# Patient Record
Sex: MALE | Race: OTHER
[De-identification: names, ages, dates, MRNs, and addresses within clinical notes are randomized per-mention and may not be internally consistent; named-entity substitution may affect disease eponyms.]

---

## 2021-03-03 ENCOUNTER — HOSPITAL ENCOUNTER (EMERGENCY)
Dept: HOSPITAL 15 - ER | Age: 61
Discharge: HOME | End: 2021-03-03
Payer: MEDICARE

## 2021-03-03 VITALS — SYSTOLIC BLOOD PRESSURE: 118 MMHG | DIASTOLIC BLOOD PRESSURE: 66 MMHG

## 2021-03-03 VITALS — WEIGHT: 147 LBS | BODY MASS INDEX: 22.28 KG/M2 | HEIGHT: 68 IN
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
## 2021-03-03 DIAGNOSIS — G20: ICD-10-CM

## 2021-03-03 DIAGNOSIS — G89.29: ICD-10-CM

## 2021-03-03 DIAGNOSIS — M25.551: ICD-10-CM

## 2021-03-03 DIAGNOSIS — M54.5: ICD-10-CM

## 2021-03-03 DIAGNOSIS — S01.311A: Primary | ICD-10-CM

## 2021-03-03 DIAGNOSIS — M47.22: ICD-10-CM
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
## 2021-03-03 LAB
ALBUMIN SERPL-MCNC: 4.2 G/DL (ref 3.4–5)
ALP SERPL-CCNC: 62 U/L (ref 45–117)
ALT SERPL-CCNC: 17 U/L (ref 16–61)
ANION GAP SERPL CALCULATED.3IONS-SCNC: 6 MMOL/L (ref 5–15)
APTT PPP: 25.3 SEC (ref 23–31.2)
BILIRUB SERPL-MCNC: 1.9 MG/DL (ref 0.2–1)
BUN SERPL-MCNC: 17 MG/DL (ref 7–18)
BUN/CREAT SERPL: 19.5
CALCIUM SERPL-MCNC: 9.1 MG/DL (ref 8.5–10.1)
CHLORIDE SERPL-SCNC: 103 MMOL/L (ref 98–107)
CO2 SERPL-SCNC: 30 MMOL/L (ref 21–32)
GLUCOSE SERPL-MCNC: 84 MG/DL (ref 74–106)
HCT VFR BLD AUTO: 41.5 % (ref 41–53)
HGB BLD-MCNC: 14.5 G/DL (ref 13.5–17.5)
INR PPP: 1.02 (ref 0.9–1.15)
MCH RBC QN AUTO: 33.5 PG (ref 28–32)
MCV RBC AUTO: 96 FL (ref 80–100)
NRBC BLD QL AUTO: 0.1 %
POTASSIUM SERPL-SCNC: 4.2 MMOL/L (ref 3.5–5.1)
PROT SERPL-MCNC: 7.2 G/DL (ref 6.4–8.2)
SODIUM SERPL-SCNC: 139 MMOL/L (ref 136–145)

## 2021-03-03 PROCEDURE — 73502 X-RAY EXAM HIP UNI 2-3 VIEWS: CPT

## 2021-03-03 PROCEDURE — 85730 THROMBOPLASTIN TIME PARTIAL: CPT

## 2021-03-03 PROCEDURE — 81002 URINALYSIS NONAUTO W/O SCOPE: CPT

## 2021-03-03 PROCEDURE — 85025 COMPLETE CBC W/AUTO DIFF WBC: CPT

## 2021-03-03 PROCEDURE — 84484 ASSAY OF TROPONIN QUANT: CPT

## 2021-03-03 PROCEDURE — 85610 PROTHROMBIN TIME: CPT

## 2021-03-03 PROCEDURE — 36415 COLL VENOUS BLD VENIPUNCTURE: CPT

## 2021-03-03 PROCEDURE — 12013 RPR F/E/E/N/L/M 2.6-5.0 CM: CPT

## 2021-03-03 PROCEDURE — 99285 EMERGENCY DEPT VISIT HI MDM: CPT

## 2021-03-03 PROCEDURE — 96374 THER/PROPH/DIAG INJ IV PUSH: CPT

## 2021-03-03 PROCEDURE — 70450 CT HEAD/BRAIN W/O DYE: CPT

## 2021-03-03 PROCEDURE — 72125 CT NECK SPINE W/O DYE: CPT

## 2021-03-03 PROCEDURE — 93005 ELECTROCARDIOGRAM TRACING: CPT

## 2021-03-03 PROCEDURE — 80053 COMPREHEN METABOLIC PANEL: CPT

## 2024-11-29 ENCOUNTER — HOSPITAL ENCOUNTER (INPATIENT)
Dept: HOSPITAL 15 - ER | Age: 64
LOS: 2 days | Discharge: HOSPICE | DRG: 56 | End: 2024-12-01
Attending: PHYSICIAN ASSISTANT
Payer: MEDICARE

## 2024-11-29 VITALS — BODY MASS INDEX: 34.75 KG/M2 | HEIGHT: 68 IN | WEIGHT: 229.28 LBS

## 2024-11-29 VITALS — OXYGEN SATURATION: 97 % | HEART RATE: 70 BPM | RESPIRATION RATE: 18 BRPM

## 2024-11-29 DIAGNOSIS — Z79.899: ICD-10-CM

## 2024-11-29 DIAGNOSIS — K14.0: ICD-10-CM

## 2024-11-29 DIAGNOSIS — G92.8: ICD-10-CM

## 2024-11-29 DIAGNOSIS — G20.A1: Primary | ICD-10-CM

## 2024-11-29 DIAGNOSIS — Z51.5: ICD-10-CM

## 2024-11-29 DIAGNOSIS — R62.7: ICD-10-CM

## 2024-11-29 DIAGNOSIS — F02.80: ICD-10-CM

## 2024-11-29 DIAGNOSIS — N40.0: ICD-10-CM

## 2024-11-29 LAB
ALBUMIN SERPL-MCNC: 4.2 G/DL (ref 3.2–4.8)
ALP SERPL-CCNC: 60 U/L (ref 46–116)
ALT SERPL-CCNC: < 9 U/L (ref 7–40)
ANION GAP SERPL CALCULATED.3IONS-SCNC: 9 MMOL/L (ref 5–15)
BILIRUB SERPL-MCNC: 2.3 MG/DL (ref 0.2–1)
BUN SERPL-MCNC: 17 MG/DL (ref 9–23)
BUN/CREAT SERPL: 29.3 (ref 10–20)
CALCIUM SERPL-MCNC: 9.7 MG/DL (ref 8.7–10.4)
CHLORIDE SERPL-SCNC: 101 MMOL/L (ref 98–107)
CO2 SERPL-SCNC: 29 MMOL/L (ref 20–31)
GLUCOSE SERPL-MCNC: 83 MG/DL (ref 74–106)
HCT VFR BLD AUTO: 39.1 % (ref 41–53)
HGB BLD-MCNC: 13.4 G/DL (ref 13.5–17.5)
MCH RBC QN AUTO: 31.7 PG (ref 28–32)
MCV RBC AUTO: 92.4 FL (ref 80–100)
NRBC BLD QL AUTO: 0.1 %
POTASSIUM SERPL-SCNC: 3.7 MMOL/L (ref 3.5–5.1)
PROT SERPL-MCNC: 6.2 G/DL (ref 5.7–8.2)
PROT UR STRIP.AUTO-MCNC: (no result) MG/DL
SODIUM SERPL-SCNC: 139 MMOL/L (ref 136–145)

## 2024-11-29 PROCEDURE — 93005 ELECTROCARDIOGRAM TRACING: CPT

## 2024-11-29 PROCEDURE — 83036 HEMOGLOBIN GLYCOSYLATED A1C: CPT

## 2024-11-29 PROCEDURE — 82607 VITAMIN B-12: CPT

## 2024-11-29 PROCEDURE — 97162 PT EVAL MOD COMPLEX 30 MIN: CPT

## 2024-11-29 PROCEDURE — 80320 DRUG SCREEN QUANTALCOHOLS: CPT

## 2024-11-29 PROCEDURE — 85379 FIBRIN DEGRADATION QUANT: CPT

## 2024-11-29 PROCEDURE — 84443 ASSAY THYROID STIM HORMONE: CPT

## 2024-11-29 PROCEDURE — 80053 COMPREHEN METABOLIC PANEL: CPT

## 2024-11-29 PROCEDURE — 82140 ASSAY OF AMMONIA: CPT

## 2024-11-29 PROCEDURE — 82746 ASSAY OF FOLIC ACID SERUM: CPT

## 2024-11-29 PROCEDURE — 80307 DRUG TEST PRSMV CHEM ANLYZR: CPT

## 2024-11-29 PROCEDURE — 85025 COMPLETE CBC W/AUTO DIFF WBC: CPT

## 2024-11-29 PROCEDURE — 84484 ASSAY OF TROPONIN QUANT: CPT

## 2024-11-29 PROCEDURE — 83735 ASSAY OF MAGNESIUM: CPT

## 2024-11-29 PROCEDURE — 81001 URINALYSIS AUTO W/SCOPE: CPT

## 2024-11-29 PROCEDURE — 36415 COLL VENOUS BLD VENIPUNCTURE: CPT

## 2024-11-29 PROCEDURE — 70450 CT HEAD/BRAIN W/O DYE: CPT

## 2024-11-29 PROCEDURE — 71045 X-RAY EXAM CHEST 1 VIEW: CPT

## 2024-11-29 RX ADMIN — MORPHINE SULFATE ONE MG: 4 INJECTION, SOLUTION INTRAMUSCULAR; INTRAVENOUS at 20:49

## 2024-11-29 RX ADMIN — MAGNESIUM SULFATE IN DEXTROSE ONE MLS/HR: 10 INJECTION, SOLUTION INTRAVENOUS at 23:11

## 2024-11-29 RX ADMIN — MORPHINE SULFATE ONE MG: 4 INJECTION, SOLUTION INTRAMUSCULAR; INTRAVENOUS at 17:38

## 2024-11-29 NOTE — ED.PDOC
HPI (NEURO)


HPI Comments


This is a 64-year-old male who arrives via EMS from home due to complaints of 

altered level of consciousness earlier today noted by wife.  Wife states that 

the patient seemed to sterile off into space and was unresponsive to verbal 

stimuli.  While the patient displays advanced Parkinson's symptoms, wife states 

that this was a new symptom that she had not experienced before.  Additionally, 

wife states that the patient may have some tongue swelling and throat swelling 

as he indicated that that might be occurring.  Patient did not display any signs

of anaphylaxis at arrival.  Wife denied any fever nausea or vomiting.  Vital 

signs were stable on arrival.  EMS states that the patient did display some mild

agonal breathing at time of arrival.  Patient was satting at 97% on room air at 

arrival.


Chief Complaint:  ALOC


Time Seen by MD:  17:00


Information Source:  Patient, Spouse


Mode of Arrival:  EMS


Severity:  Mild


Dizziness/Weakness Severity:  Unable to do activities


Headache Severity:  None


Timing:  Hours


Duration:  Minutes


Prehospital treatment:  12 Lead EKG


Onset:  At rest


Circumstances:  Spontaneous


Symptoms:  Difficulty talking


Associated Signs and Symptoms:  Altered Mental Status





Past Medical History


PAST MEDICAL HISTORY:  Denies


Past Medical History (Other):  


Advanced Parkinson's


Surgical History:  Denies all surgeries





Family History


Family History:  Reviewed,noncontributory to illness





Social History


Smoker:  Non-Smoker


Alcohol:  Denies ETOH Use


Drugs:  Denies Drug Use


Lives In:  Home





Constitutional:  denies: chills, diaphoresis, fatigue, fever, malaise, sweats, 

weakness, others


EENTM:  reports: others (Possible tongue swelling); denies: blurred vision, d

ouble vision, ear bleeding, ear discharge, ear drainage, ear pain, ear ringing, 

eye pain, eye redness, hearing loss, mouth pain, mouth swelling, nasal 

discharge, nose bleeding, nose congestion, nose pain, photophobia, tearing, 

throat pain, throat swelling, voice changes


Respiratory:  denies: cough, hemoptysis, orthopnea, SOB at rest, shortness of 

breath, SOB with excertion, stridor, wheezing, others


Cardiovascular:  denies: chest pain, dizzy spells, diaphoresis, Dyspnea on 

exertion, edema, irregular heart beat, left arm pain, lightheadedness, 

palpitations, PND, syncope, others


Gastrointestinal:  denies: abdomen distended, abdominal pain, blood streaked 

bowels, constipated, diarrhea, dysphagia, difficulty swallowing, hematemesis, 

melena, nausea, poor appetite, poor fluid intake, rectal bleeding, rectal pain, 

vomiting, others


Genitourinary:  denies: burning, dysuria, flank pain, frequency, hematuria, 

incontinence, penile discharge, penile sore, pain, testicle pain, testicle 

swelling, urgency, others


Neurological:  denies: dizziness, fainting, headache, left sided numbness, left 

sided weakness, numbness, paresthesia, pre-existing deficit, right sided 

numbness, right sided weakness, seizure, speech problems, tingling, tremors, 

weakness, others


Musculoskeletal:  denies: back pain, gout, joint pain, joint swelling, muscle 

pain, muscle stiffness, neck pain, others


Integumetry:  denies: bruises, change in color, change in hair/nails, dryness, 

laceration, lesions, lumps, rash, wounds, others


Allergic/Immunocompromised:  denies: Difficulty Healing, Frequent Infections, 

Hives, Itching, others


Hematologic/Lymphatic:  denies: anemia, blood clots, easy bleeding, easy 

bruising, swollen glands, others


Endocrine:  denies: excessive hunger, excessive sweating, excessive thirst, 

excessive urination, flushing, intolerance to cold, intolerance to heat, 

unexplained weight gain, unexplained weight loss, others


Psychiatric:  denies: anxiety, bipolar disorder, depression, hopeless, panic 

disorder, schizophrenia, sleepless, suicidal, others


Unable to Obtain due to:  Altered Mental Status





Physical Exam


General Appearance:  Moderate Distress (Patient does not appear to be in 

distress but does display advanced Parkinson signs.  Patient is bed-bound and 

frail.), Normal


HEENT:  Head (Cranial exam was unremarkable.  No signs of trauma.  No skull 

depressions or deformities.), Normal ENT Inspection, Pharynx Normal, TMs Normal,

Other (Unremarkable oropharynx.  Patient did not appear to have any swelling of 

the tongue.  Airway was patent.)


Neck:  Full Range of Motion, Non-Tender, Normal, Normal Inspection


Respiratory:  Chest Non-Tender, Lungs Clear, No Accessory Muscle Use, No 

Respiratory Distress, Normal Breath Sounds


Cardiovascular:  No Edema, No JVD, No Murmur, No Gallop, Normal Peripheral 

Pulses, Regular Rate/Rhythm


Breast Exam:  Deferred


Gastrointestinal:  No Organomegaly, Non Tender, No Pulsatile Mass, Normal Bowel 

Sounds, Soft


Genitalia:  Deferred


Pelvic:  Deferred


Rectal:  Deferred


Extremities:  NOT DONE


Neurologic:  NOT DONE


Cerebellar Function:  NOT DONE


Reflexes:  NOT DONE


Skin:  Dry, Normal Color, Warm


Lymphatic:  No Adenopathy





Was a procedure done?


Was a procedure done?:  No





Differential Diagnosis (SZ)


Seizure:  Other (Altered mental status, intracranial mass, anaphylactic 

response)





X-Ray, Labs, Meds, VS





                                   Vital Signs








  Date Time  Temp Pulse Resp B/P (MAP) Pulse Ox O2 Delivery O2 Flow Rate FiO2


 


11/29/24 18:03  77 18 105/65    


 


11/29/24 18:01  78 18 105/65 (78) 97   


 


11/29/24 18:00  81      


 


11/29/24 17:38  70 18 120/72    


 


11/29/24 17:22  70 18  97 Room Air* 0 21


 


11/29/24 17:21 98.2 77 18 120/69 (86) 97   





 98.2       


 


11/29/24 17:08 98.5 74 16 125/83 (97) 98   








                                       Lab








Test


 11/29/24


17:23 Range/Units


 


 


White Blood Count


 7.6 


 4.4-10.8


10^3/uL


 


Red Blood Count


 4.24 L


 4.5-5.90


10^6/uL


 


Hemoglobin 13.4 L 13.5-17.5  g/dL


 


Hematocrit 39.1 L 41.0-53.0  %


 


Mean Corpuscular Volume 92.4  80.0-100.0  fL


 


Mean Corpuscular Hemoglobin 31.7  28.0-32.0  pg


 


Mean Corpuscular Hemoglobin


Concent 34.3 


 32.0-36.0  g/dL





 


Red Cell Distribution Width 15.6 H 11.8-14.3  %


 


Platelet Count


 246 


 140-450


10^3/uL


 


Mean Platelet Volume 8.5  6.9-10.8  fL


 


Neutrophils (%) (Auto) 76.7  37.0-80.0  %


 


Lymphocytes (%) (Auto) 18.3  10.0-50.0  %


 


Monocytes (%) (Auto) 4.4  0.0-12.0  %


 


Eosinophils (%) (Auto) 0.4  0.0-7.0  %


 


Basophils (%) (Auto) 0.2  0.0-2.0  %


 


Neutrophils # (Auto)


 5.8 


 1.6-8.6  10


^3/uL


 


Lymphocytes # (Auto)


 1.4 


 0.4-5.4  10


^3/uL


 


Monocytes # (Auto) 0.3  0-1.3  10 ^3/uL


 


Eosinophils # (Auto) 0  0-0.8  10 ^3/uL


 


Basophils # (Auto) 0  0-0.2  10 ^3/uL


 


Nucleated Red Blood Cells 0.1   %


 


D-Dimer, Quantitative


 0.56 H


 0.0-0.49  mg/L


FEU


 


Sodium Level 139  136-145  mmol/L


 


Potassium Level 3.7  3.5-5.1  mmol/L


 


Chloride Level 101    mmol/L


 


Carbon Dioxide Level 29  20-31  mmol/L


 


Anion Gap 9  5-15  


 


Blood Urea Nitrogen 17  9-23  mg/dL


 


Creatinine


 0.58 L


 0.700-1.30


mg/dL


 


Glomerular Filtration Rate


Calc 109 


 >90  mL/min





 


BUN/Creatinine Ratio 29.3 H 10.0-20.0  


 


Serum Glucose 83    mg/dL


 


Calcium Level 9.7  8.7-10.4  mg/dL


 


Total Bilirubin 2.3 H 0.2-1.0  mg/dL


 


Aspartate Amino Transferase


(AST) 8 L


 13-40  U/L





 


Alanine Aminotransferase (ALT) < 9  7-40  U/L


 


Alkaline Phosphatase 60    U/L


 


Troponin I High Sensitivity < 3 L </=54  ng/L


 


Total Protein 6.2  5.7-8.2  g/dL


 


Albumin 4.2  3.2-4.8  g/dL








                               Current Medications








 Medications


  (Trade)  Dose


 Ordered  Sig/Nano


 Route  Start Time


 Stop Time Status Last Admin


 


 Morphine Sulfate  4 mg  ONCE  ONCE


 IV  11/29/24 17:15


 11/29/24 17:16 DC 11/29/24 17:38











X-Ray, Labs, Meds, VS Comment


Advised patient's family that all studies performed the ED today were 

unremarkable for any acute process.  Wife is very fearful of helping the patient

at home as it was just her and her .  Patient will be admitted for unsafe

discharged and case management will discuss home health and hospice assistance 

with the family tomorrow.


Time of 1ST Reevaluation:  19:38


Reevaluation 1ST:  Improved


Consultation:  PCP


Patient Education/Counseling:  Diagnosis, Treatment


Family Education/Counseling:  Diagnosis, Treatment





Departure 1


Departure


Time of Disposition:  19:41


Impression:  


   Primary Impression:  


   Altered mental status


   Additional Impressions:  


   Parkinsons disease


   Failure to thrive


   Encounter for hospice care


Disposition:  09 ADMITTED AS INPATIENT


Condition:  Stable


Discharged With:  Self, Spouse





Critical Care Note


Critical Care Time?:  No





Stability


Stability form required:  No





Heart Score


Heart Score:  








Heart Score Response (Comments) Value


 


History Slightly Suspicious 0


 


EKG Sig ST-Deviation 2


 


Age                                     45-64 1


 


Risk Factors                            1 or 2 risk factors 1


 


Troponin Normal limit 0


 


Total  4

















JUSTIN CAMPUZANO PAC               Nov 29, 2024 19:42

## 2024-11-29 NOTE — [facility]
CHEST RADIOGRAPH



Indication: Shortness a breath



Technique: Single frontal view of the chest was obtained



Comparison: None



FINDINGS: 



Lines and Tubes: None



Lungs: No focal consolidation.



Pleura: No effusion.



No pneumothorax. 



Cardiomediastinal contours: Unremarkable



Bones: No acute osseous abnormality.



IMPRESSION:



No acute cardiopulmonary disease.



Electronically Signed by: Gayla Gore at 11/29/2024 17:57:47 PM

## 2024-11-29 NOTE — DVHHPRES
History of Present Illness


Resident Creating Document:  CAIT PALMA RESIDENT


History of Present Illness


Patient is 64 years old male with past medical history of advanced Parkinson 

disease, BEP was brought in by the family for altered mental status.  As per 

daughter patient has been more incoherent.  Patient's daughter also reported 

that patient's function has been declining.  Patient isn't mobile, slow speech, 

bed ridden.  Per patients she noticed some facial deviation, poorly responsive 

to verbal stimuli kind of staring at her last night.  Lately patient has been 

coughing with make his some phlegm and having some difficulty swallowing today. 

Patient's family also reported that the patient's tongue has been swollen lately

but today was even worse.  Patient's family is asking for comfort care and 

recreation for social service consult for replacement.  Initial lab workup 

revealed elevated bilirubin 2.3, other blood workup with a normal limit.  

Urinalysis negative for UTI, UDS was negative.  CT scan was negative for any 

acute intracranial hemorrhage or other pathology.  CXR - no acute 

cardiopulmonary disease.


Past Medical History


Advanced Parkinson's disease


Past Surgical History


Three back surgery, bariatric surgery,


Family History


Mom had diabetes mellitus, dementia.


Past Social History


Lives at home with the family, denies smoking/alcoholism/drug abuse





Review of Systems


Review of Systems





Allergy- NKDA 


Patient was seen today at the bedside. 


Cardiovascular- deny acute chest pain or shortness of breath or cough or 

palpitation


Respiratory- denies cough or short of breath or wheezing


Gastrointestinal- denies any rectal bleeding, nausea or vomiting


Musculoskeletal-denies acute joint swelling or tenderness or redness


Neurological- slowly speech, hand and foot rigidity+


Psychiatry- denies depression or SI or HI


Skin- fragile skin


Allergies:  


Coded Allergies:  


     NO KNOWN ALLERGIES (Unverified , 3/3/21)


Medications





                               Current Medications








 Medications  Dose


 Ordered  Sig/Nano


 Route  Start Time


 Stop Time Status Last Admin


Dose Admin


 


 Ondansetron HCl  4 mg  Q4HP  PRN


 IV  11/29/24 21:15


     





 


 Docusate Sodium  100 mg  BIDPRN  PRN


 PO  11/29/24 21:15


     





 


 Enoxaparin Sodium  40 mg  DAILY


 SC  11/30/24 10:00


     





 


 Acetaminophen  650 mg  Q6HP  PRN


 PO  11/29/24 21:15


     





 


 Morphine Sulfate  2 mg  Q4HPRN  PRN


 IV  11/29/24 21:15


     





 


 Nitroglycerin  0.4 mg  Q5MINP  PRN


 SL  11/29/24 21:15


     





 


 Morphine Sulfate  2 mg  Q30M  PRN


 IV  11/29/24 21:15


     














Exam


Vital Signs





Vital Signs








  Date Time  Temp Pulse Resp B/P (MAP) Pulse Ox O2 Delivery O2 Flow Rate FiO2


 


11/29/24 21:27  77 16 104/64    


 


11/29/24 20:00 98.2    96   





 98.2       


 


11/29/24 19:15      Room Air* 0 21








Exam


General examination- awake, slow speech


HEENT- PEERLA, no acute nasal discharge


Cardiovascular- S1-S2 audible, rate and rhythm regular,  no murmur


Respiratory- CTAB, no wheeze or rhonchi


Gastrointestinal-nontender,  bowel sound+.  Nondistended


Musculoskeletal-no acute joint swelling or tenderness or redness#


Lower extremity- no leg edema


Neurological- slow speech, hand rigidity+


Psychiatry- denies depression or SI or HI


Skin- fragile skin





Labs/Xrays





                                      Labs








Test


 11/29/24


21:15 11/29/24


17:23 Range/Units


 


 


White Blood Count


 


 7.6 


 4.4-10.8


10^3/uL


 


Red Blood Count


 


 4.24 L


 4.5-5.90


10^6/uL


 


Hemoglobin  13.4 L 13.5-17.5  g/dL


 


Hematocrit  39.1 L 41.0-53.0  %


 


Mean Corpuscular Volume  92.4  80.0-100.0  fL


 


Mean Corpuscular Hemoglobin  31.7  28.0-32.0  pg


 


Mean Corpuscular Hemoglobin


Concent 


 34.3 


 32.0-36.0  g/dL





 


Red Cell Distribution Width  15.6 H 11.8-14.3  %


 


Platelet Count


 


 246 


 140-450


10^3/uL


 


Mean Platelet Volume  8.5  6.9-10.8  fL


 


Neutrophils (%) (Auto)  76.7  37.0-80.0  %


 


Lymphocytes (%) (Auto)  18.3  10.0-50.0  %


 


Monocytes (%) (Auto)  4.4  0.0-12.0  %


 


Eosinophils (%) (Auto)  0.4  0.0-7.0  %


 


Basophils (%) (Auto)  0.2  0.0-2.0  %


 


Neutrophils # (Auto)


 


 5.8 


 1.6-8.6  10


^3/uL


 


Lymphocytes # (Auto)


 


 1.4 


 0.4-5.4  10


^3/uL


 


Monocytes # (Auto)  0.3  0-1.3  10 ^3/uL


 


Eosinophils # (Auto)  0  0-0.8  10 ^3/uL


 


Basophils # (Auto)  0  0-0.2  10 ^3/uL


 


Nucleated Red Blood Cells  0.1   %


 


D-Dimer, Quantitative


 


 0.56 H


 0.0-0.49  mg/L


FEU


 


Sodium Level  139  136-145  mmol/L


 


Potassium Level  3.7  3.5-5.1  mmol/L


 


Chloride Level  101    mmol/L


 


Carbon Dioxide Level  29  20-31  mmol/L


 


Anion Gap  9  5-15  


 


Blood Urea Nitrogen  17  9-23  mg/dL


 


Creatinine


 


 0.58 L


 0.700-1.30


mg/dL


 


Glomerular Filtration Rate


Calc 


 109 


 >90  mL/min





 


BUN/Creatinine Ratio  29.3 H 10.0-20.0  


 


Serum Glucose  83    mg/dL


 


Calcium Level  9.7  8.7-10.4  mg/dL


 


Total Bilirubin  2.3 H 0.2-1.0  mg/dL


 


Aspartate Amino Transferase


(AST) 


 8 L


 13-40  U/L





 


Alanine Aminotransferase (ALT)  < 9  7-40  U/L


 


Alkaline Phosphatase  60    U/L


 


Troponin I High Sensitivity  < 3 L </=54  ng/L


 


Total Protein  6.2  5.7-8.2  g/dL


 


Albumin  4.2  3.2-4.8  g/dL











Assessment/Plan


Assessment/Plan





# altered mental status likely due to metabolic/toxic  encephalopathy


-bilirubin 2.3


-patient is a polypharmacy


-CT head negative for acute intracranial hemorrhage or infarction


-x-ray chest no acute cardiopulmonary disease


-continue current medication as prescribed


-swallow evaluation


-continue IV fluid as prescribed


-social service consult for comfort care and placement





# suspected TIA


-CT head negative for acute infarction hemorrhage


-continue aspirin 81 mg daily


-atorvastatin 40 mg q.h.s.





# glossitis


-continue nystatin as prescribed





# advanced Parkinson's disease


-continue carvedilol with levodopa as prescribed


-continue current management





# benign enlargement of prostate


-continue Flomax 0.4 mg q.d.


-finasteride 5 mg p.o. daily








# failure to thrive


-ordered nutrition Service consultation





Goals of care/advance care planning; FULL CODE; discussed with the patient >15  

minutes





PUD prophylaxis:  Pantoprazole


DVT prophylaxis:  Lovenox





Plan discussed with Dr. Melendez, nursing staff, patient


Total time spent on patient evaluation, chart review, assessment and plan, 

discussion  discussion >30  minutes


Plan discussed with:  Patient


Plan discussed with:  Patient, Spouse, Daughter (RN), Other


My Orders





                         Orders - CAIT PALMA








Procedure Category Date Status





  Time 


 


Thyroid Stimulating LAB 11/29/24 In Process





Hormone  21:03 


 


Magnesium LAB 11/29/24 In Process





  21:03 


 


Vitamin B12 LAB 11/29/24 In Process





  21:03 


 


Folate (Folic Acid) LAB 11/29/24 In Process





  21:03 


 


Drug Screen LAB 11/29/24 Logged





  21:04 


 


Blood Alcohol LAB 11/29/24 In Process





  21:04 


 


Admit ADMIT 11/29/24 Transmitted





  21:06 


 


Code Status CODE 11/29/24 Transmitted





  21:06 


 


Ondansetron Hcl PHA 11/29/24 In Process





 (Zofran)  21:15 


 


Docusate Sodium PHA 11/29/24 In Process





Capsule (Colace  21:15 


 


Enoxaparin Sodium PHA 11/30/24 In Process





 (Lovenox)  10:00 


 


Fall Risk Precautions TOLU 11/29/24 In Process





In Place  21:06 


 


Complete Blood Count LAB 11/30/24 Verified





  04:00 


 


Comprehensive LAB 11/30/24 Verified





Metabolic Panel  04:00 


 


Acetaminophen Tablet PHA 11/29/24 In Process





 (Tylenol Tablet)  21:15 


 


Morphine Sulfate PHA 11/29/24 In Process





Injection  21:15 


 


Nitroglycerin PHA 11/29/24 In Process





Sublingual (Ntrostat  21:15 


 


Morphine Sulfate PHA 11/29/24 In Process





Injection  21:15 


 


Oxygen By Nasal RT 11/29/24 Transmitted





Cannula  21:06 


 


Stat Ekg For Chest TOLU 11/29/24 In Process





Pain  21:06 


 


Notify Md Of Changes TOLU 11/29/24 In Process





From Base  21:06 


 


Telemetry Monitor For TOLU 11/29/24 In Process





24 Hours  21:06 


 


Emergency Dysrhythmia TOLU 11/29/24 In Process





Protocol  21:06 


 


Rhythm Strips Once TOLU 11/29/24 In Process





Every Shift  21:06 











Date of Service:  Nov 29, 2024


Billing Provider:  PEREZ MELENDEZ MD


Common Visit Codes:  99223-INITIAL  INP/OBS CARE (HIGH)


Secondary Visit Codes:  99497-ADVANCED CARE PLAN 30 MINUTES











CAIT PALMA         Nov 29, 2024 21:58


PEREZ MELENDEZ MD             Dec 1, 2024 21:23

## 2024-11-29 NOTE — DV[facility]
EXAM: CT HEAD WITHOUT CONTRAST



INDICATION: Altered mental status



TECHNIQUE:  CT of the head without intravenous contrast.



Radiation Dose Information:



CT Dose: CTDI volume is 57.28 mGy. Dose-length product is 1128.75 mGy*cm



The dose indicators for CT are the volume Computed Tomography (CT) Dose Index (CTDIvol) and the Dose 
Length Product (DLP), and are measured in units of mGy and mGy-cm, respectively. These indicators are
 not patient dose, but values generated from the CT scanner acquisition factors. The report includes 
radiation exposure data for exposures received during this examination.  



COMPARISON: CERVICAL WITHOUT CONTRAST on DOS: 3/3/21, HEAD WITHOUT CONTRAST on DOS: 3/3/21



FINDINGS:



There is no evidence of acute intracranial hemorrhage, extra-axial collection, mass effect, midline s
hift, herniation or hydrocephalus. 



The ventricles, sulci and cisterns are age appropriate.



The gray-white differentiation is intact. 



Patchy periventricular and subcortical white matter hypoattenuation is nonspecific but may be related
 to small vessel ischemic disease. Cavum vergae of doubtful clinical concern unchanged from 03/03/202
1



The visualized paranasal sinuses and mastoid air cells are clear. 



The surrounding soft tissues and osseous structures are unremarkable.



IMPRESSION:



1. No acute intracranial hemorrhage.



2. No CT findings of territorial ischemia.



3. No significant change from 03/03/2021.



Electronically Signed by: Ken Miller at 11/29/2024 17:52:18 PM

## 2024-11-30 VITALS
DIASTOLIC BLOOD PRESSURE: 64 MMHG | HEART RATE: 91 BPM | RESPIRATION RATE: 18 BRPM | SYSTOLIC BLOOD PRESSURE: 104 MMHG | TEMPERATURE: 97.8 F | OXYGEN SATURATION: 94 %

## 2024-11-30 VITALS
TEMPERATURE: 98.4 F | SYSTOLIC BLOOD PRESSURE: 91 MMHG | OXYGEN SATURATION: 94 % | DIASTOLIC BLOOD PRESSURE: 59 MMHG | HEART RATE: 93 BPM | RESPIRATION RATE: 17 BRPM

## 2024-11-30 VITALS — RESPIRATION RATE: 15 BRPM | OXYGEN SATURATION: 99 % | HEART RATE: 86 BPM

## 2024-11-30 LAB
ALBUMIN SERPL-MCNC: 4.3 G/DL (ref 3.2–4.8)
ALP SERPL-CCNC: 60 U/L (ref 46–116)
ALT SERPL-CCNC: < 9 U/L (ref 7–40)
ANION GAP SERPL CALCULATED.3IONS-SCNC: 10 MMOL/L (ref 5–15)
BILIRUB SERPL-MCNC: 2.1 MG/DL (ref 0.2–1)
BUN SERPL-MCNC: 13 MG/DL (ref 9–23)
BUN/CREAT SERPL: 18.1 (ref 10–20)
CALCIUM SERPL-MCNC: 9.5 MG/DL (ref 8.7–10.4)
CHLORIDE SERPL-SCNC: 102 MMOL/L (ref 98–107)
CO2 SERPL-SCNC: 27 MMOL/L (ref 20–31)
GLUCOSE SERPL-MCNC: 156 MG/DL (ref 74–106)
HCT VFR BLD AUTO: 38.7 % (ref 41–53)
HGB BLD-MCNC: 13.2 G/DL (ref 13.5–17.5)
MCH RBC QN AUTO: 31.4 PG (ref 28–32)
MCV RBC AUTO: 92.1 FL (ref 80–100)
NRBC BLD QL AUTO: 0.1 %
POTASSIUM SERPL-SCNC: 4.1 MMOL/L (ref 3.5–5.1)
PROT SERPL-MCNC: 6.4 G/DL (ref 5.7–8.2)
SODIUM SERPL-SCNC: 139 MMOL/L (ref 136–145)

## 2024-11-30 RX ADMIN — NYSTATIN ONE ML: 500000 SUSPENSION ORAL at 00:58

## 2024-11-30 RX ADMIN — DEXTROSE AND SODIUM CHLORIDE SCH MLS/HR: 5; 900 INJECTION, SOLUTION INTRAVENOUS at 00:04

## 2024-11-30 RX ADMIN — MORPHINE SULFATE PRN MG: 2 INJECTION, SOLUTION INTRAMUSCULAR; INTRAVENOUS at 00:03

## 2024-11-30 RX ADMIN — Medication ONE ML: at 07:59

## 2024-11-30 RX ADMIN — ENOXAPARIN SODIUM SCH MG: 40 INJECTION SUBCUTANEOUS at 10:46

## 2024-11-30 RX ADMIN — ONDANSETRON HYDROCHLORIDE PRN MG: 2 INJECTION, SOLUTION INTRAMUSCULAR; INTRAVENOUS at 00:02

## 2024-11-30 RX ADMIN — Medication SCH TAB: at 05:33

## 2024-11-30 RX ADMIN — TAMSULOSIN HYDROCHLORIDE SCH MG: 0.4 CAPSULE ORAL at 17:20

## 2024-11-30 RX ADMIN — FINASTERIDE SCH MG: 5 TABLET, FILM COATED ORAL at 10:46

## 2024-11-30 RX ADMIN — NYSTATIN SCH ML: 500000 SUSPENSION ORAL at 05:33

## 2024-11-30 RX ADMIN — ATORVASTATIN CALCIUM ONE MG: 20 TABLET, FILM COATED ORAL at 08:00

## 2024-11-30 NOTE — ECG
[facility]

                                       

Test Date:    2024               Test Time:    19:46:33

Pat Name:     CLAY ROBERSON         Department:   ER

Patient ID:   [id number]           Room:         0235

Gender:       M                        Technician:   PEPE

:          1960               Requested By: JUSTIN CAMPUZANO

Order Number: [id number]        Reading MD:   Jean Sher

                                 Measurements

Intervals                              Axis          

Rate:         67                       P:            54

ND:           194                      QRS:          77

QRSD:         102                      T:            46

QT:           429                                    

QTc:          453                                    

                           Interpretive Statements

Sinus rhythm

Abnormal R-wave progression, early transition

Electronically Signed On 2024 16:08:52 PST by Jean Sher



Please click the below link to view image of tracing.

## 2024-11-30 NOTE — DVHPNRES
Progress Note


Date Seen:  Nov 30, 2024


Resident Creating Document:  JACOB LIN RESIDENT


Has the PT tested + for MRSA


If YES, has PT been informed?:  No





Medical Necessity Reason


Pt with a Central, PICC or Fol:  No





Subjective


Review of Systems


A 64 year old male with past medical history of advanced Parkinson disease, BEP 

was brought in by the family for altered mental status.  As per daughter patient

has been more incoherent.  Patient's daughter also reported that patient's 

function has been declining.  Patient isn't mobile, slow speech, bed ridden.  

Per patients she noticed some facial deviation, poorly responsive to verbal 

stimuli kind of staring at her last night.  Lately patient has been coughing 

with make his some phlegm and having some difficulty swallowing today.  

Patient's family also reported that the patient's tongue has been swollen lately

but today was even worse.  Patient's family is asking for comfort care and 

recreation for social service consult for replacement.  Initial lab workup 

revealed elevated bilirubin 2.3, other blood workup with a normal limit.  

Urinalysis negative for UTI, UDS was negative.  CT scan was negative for any 

acute intracranial hemorrhage or other pathology.  CXR - no acute 

cardiopulmonary disease.


Past Medical History


Advanced Parkinson's disease


Past Surgical History


Three back surgery, bariatric surgery,


Family History


Mom had diabetes mellitus, dementia.


Past Social History


Lives at home with the family, denies smoking/alcoholism/drug abuse





Objective


vital signs





                                   Vital Sign








  Date Time  Temp Pulse Resp B/P (MAP) Pulse Ox O2 Delivery O2 Flow Rate FiO2


 


11/30/24 08:55 97.6 86 15 126/80 (95) 99   





 97.6       


 


11/30/24 08:25      Nasal Cannula* 2 28














                           Total Intake and Output   


 


 11/29/24 11/29/24 11/30/24





 15:00 23:00 07:00


 


Intake Total  51 ml 700 ml


 


Output Total   200 ml


 


Balance  51 ml 500 ml








medications





                               Current Medications








 Medications  Dose


 Ordered  Sig/Nano


 Route  Start Time


 Stop Time Status Last Admin


Dose Admin


 


 Ondansetron HCl  4 mg  Q4HP  PRN


 IV  11/29/24 21:15


    11/30/24 00:02


4 MG


 


 Docusate Sodium  100 mg  BIDPRN  PRN


 PO  11/29/24 21:15


     





 


 Enoxaparin Sodium  40 mg  DAILY


 SC  11/30/24 10:00


     





 


 Acetaminophen  650 mg  Q6HP  PRN


 PO  11/29/24 21:15


     





 


 Morphine Sulfate  2 mg  Q4HPRN  PRN


 IV  11/29/24 21:15


    11/30/24 08:00


2 MG


 


 Nitroglycerin  0.4 mg  Q5MINP  PRN


 SL  11/29/24 21:15


     





 


 Morphine Sulfate  2 mg  Q30M  PRN


 IV  11/29/24 21:15


     





 


 Dextrose/Sodium


 Chloride  1,000 ml @ 


 100 mls/hr  Q10H


 IV  11/29/24 22:45


    11/30/24 08:50


100 MLS/HR


 


 Finasteride  5 mg  DAILY


 PO  11/30/24 10:00


     





 


 Carbidopa/Levodopa  1 tab  TID


 PO  11/30/24 06:00


    11/30/24 05:33


1 TAB


 


 Tamsulosin HCl  0.4 mg  QPM


 PO  11/30/24 18:00


     





 


 Nystatin  5 ml  QID


 MT  11/30/24 06:00


    11/30/24 05:33


5 ML


 


 Lactulose  30 ml  BIDPRN  PRN


 PO  11/30/24 07:30


     





 


 Aspirin  81 mg  DAILY


 PO  11/30/24 10:00


     





 


 Atorvastatin


 Calcium  40 mg  HS


 PO  11/30/24 22:00


     











Examination


General examination- awake, slow speech


HEENT- PEERLA, no acute nasal discharge


Cardiovascular- S1-S2 audible, rate and rhythm regular,  no murmur


Respiratory- CTAB, no wheeze or rhonchi


Gastrointestinal-nontender,  bowel sound+.  Nondistended


Musculoskeletal-no acute joint swelling or tenderness or redness#


Lower extremity- no leg edema


Neurological- slow speech, hand rigidity+


Psychiatry- denies depression or SI or HI


Skin- fragile skin


laboratory and microbiology


                                Laboratory Tests


11/30/24 04:23

















Test


 11/30/24


04:23 Range/Units


 


 


Serum Glucose 156 H   mg/dL











Problem List/Assessment/Plan


Problem List/Assessment/Plan


#altered mental status likely due to advance dementia 


-bilirubin 2.3 normal AP, no anemia 


-CT head negative for acute intracranial hemorrhage or infarction


-x-ray chest no acute cardiopulmonary disease


-continue current medication as prescribed


-swallow evaluation


-continue IV fluid as prescribed


-social service consult for comfort care and placement





#  TIA unlikely: no focal deficit 


# advanced Parkinson's disease


-continue carvedilol with levodopa as prescribed


-continue current management





# benign enlargement of prostate


-continue Flomax 0.4 mg q.d.


-finasteride 5 mg p.o. daily








# failure to thrive


-ordered nutrition Service consultation





Goals of care/advance care planning; FULL CODE; discussed with the patient >15  

minutes





PUD prophylaxis:  Pantoprazole


DVT prophylaxis:  Lovenox





Plan discussed with Dr. Hobson, nursing staff, patient


Total time spent on patient evaluation, chart review, assessment and plan, 

discussion  discussion >30  minutes


Plan discussed with:  Patient


Plan discussed with:  Patient, Other (rn)





Date of Service:  Nov 30, 2024


Billing Provider:  XUAN HOBSON MD


Common Visit Codes:  [id number] INP/OBS CARE(HIGH)











JACOB LIN RESIDENT        Nov 30, 2024 08:59


XUAN HOBSON MD              Nov 30, 2024 22:05

## 2024-12-01 VITALS
RESPIRATION RATE: 17 BRPM | OXYGEN SATURATION: 96 % | SYSTOLIC BLOOD PRESSURE: 98 MMHG | DIASTOLIC BLOOD PRESSURE: 63 MMHG | HEART RATE: 88 BPM | TEMPERATURE: 97.5 F

## 2024-12-01 VITALS
RESPIRATION RATE: 15 BRPM | SYSTOLIC BLOOD PRESSURE: 117 MMHG | TEMPERATURE: 98.1 F | DIASTOLIC BLOOD PRESSURE: 67 MMHG | OXYGEN SATURATION: 95 % | HEART RATE: 83 BPM

## 2024-12-01 VITALS
SYSTOLIC BLOOD PRESSURE: 130 MMHG | HEART RATE: 89 BPM | DIASTOLIC BLOOD PRESSURE: 73 MMHG | RESPIRATION RATE: 17 BRPM | TEMPERATURE: 97.4 F | OXYGEN SATURATION: 95 %

## 2024-12-01 NOTE — DVHDS2
Discharge Summary


Date of Admission


Nov 29, 2024 at 21:06





Date of Discharge:


Nov 30, 2024





Admitting Diagnosis


Altered mental status with advanced Parkinson and dementia





Labs/Diagnostic Data:





                               Laboratory Results








Test


 11/30/24


04:23 11/29/24


22:50 11/29/24


22:26 11/29/24


21:15


 


White Blood Count


 6.5 10^3/uL


(4.4-10.8) 


 


 





 


Red Blood Count


 4.21 10^6/uL


(4.5-5.90) 


 


 





 


Hemoglobin


 13.2 g/dL


(13.5-17.5) 


 


 





 


Hematocrit


 38.7 %


(41.0-53.0) 


 


 





 


Mean Corpuscular Volume


 92.1 fL


(80.0-100.0) 


 


 





 


Mean Corpuscular Hemoglobin


 31.4 pg


(28.0-32.0) 


 


 





 


Mean Corpuscular Hemoglobin


Concent 34.1 g/dL


(32.0-36.0) 


 


 





 


Red Cell Distribution Width


 16.1 %


(11.8-14.3) 


 


 





 


Platelet Count


 255 10^3/uL


(140-450) 


 


 





 


Mean Platelet Volume


 8.8 fL


(6.9-10.8) 


 


 





 


Neutrophils (%) (Auto)


 92.5 %


(37.0-80.0) 


 


 





 


Lymphocytes (%) (Auto)


 6.1 %


(10.0-50.0) 


 


 





 


Monocytes (%) (Auto)


 1.3 %


(0.0-12.0) 


 


 





 


Eosinophils (%) (Auto)


 0.0 %


(0.0-7.0) 


 


 





 


Basophils (%) (Auto)


 0.1 %


(0.0-2.0) 


 


 





 


Neutrophils # (Auto)


 6.0 10 ^3/uL


(1.6-8.6) 


 


 





 


Lymphocytes # (Auto)


 0.4 10 ^3/uL


(0.4-5.4) 


 


 





 


Monocytes # (Auto)


 0.1 10 ^3/uL


(0-1.3) 


 


 





 


Eosinophils # (Auto)


 0 10 ^3/uL


(0-0.8) 


 


 





 


Basophils # (Auto)


 0 10 ^3/uL


(0-0.2) 


 


 





 


Nucleated Red Blood Cells 0.1 %    


 


Sodium Level


 139 mmol/L


(136-145) 


 


 





 


Potassium Level


 4.1 mmol/L


(3.5-5.1) 


 


 





 


Chloride Level


 102 mmol/L


() 


 


 





 


Carbon Dioxide Level


 27 mmol/L


(20-31) 


 


 





 


Anion Gap 10 (5-15)    


 


Blood Urea Nitrogen


 13 mg/dL


(9-23) 


 


 





 


Creatinine


 0.72 mg/dL


(0.700-1.30) 


 


 





 


Glomerular Filtration Rate


Calc 102 mL/min


(>90) 


 


 





 


BUN/Creatinine Ratio


 18.1


(10.0-20.0) 


 


 





 


Serum Glucose


 156 mg/dL


() 


 


 





 


Calcium Level


 9.5 mg/dL


(8.7-10.4) 


 


 





 


Total Bilirubin


 2.1 mg/dL


(0.2-1.0) 


 


 





 


Aspartate Amino Transferase


(AST) 8 U/L (13-40) 


 


 


 





 


Alanine Aminotransferase (ALT) < 9 U/L (7-40)    


 


Alkaline Phosphatase


 60 U/L


() 


 


 





 


Total Protein


 6.4 g/dL


(5.7-8.2) 


 


 





 


Albumin


 4.3 g/dL


(3.2-4.8) 


 


 





 


Urine Color


 


 Yellow


(Yellow) 


 





 


Urine Clarity  Clear (Clear)   


 


Urine pH  5.5 (5.0-9.0)   


 


Urine Specific Gravity


 


 1.037


(1.001-1.035) 


 





 


Urine Protein


 


 Trace


(Negative) 


 





 


Urine Ketones  1+ (Negative)   


 


Urine Blood


 


 Negative /uL


(Negative) 


 





 


Urine Nitrite


 


 Negative


(Negative) 


 





 


Urine Bilirubin


 


 Negative


(Negative) 


 





 


Urine Urobilinogen


 


 3 mg/dL


(Negative) 


 





 


Urine Leukocyte Esterase


 


 Negative /uL


(Negative) 


 





 


Urine RBC


 


 <1 /hpf (0 -


3) 


 





 


Urine WBC  1 /hpf (0 - 3)   


 


Urine Squamous Epithelial


Cells 


 Few /hpf (<5) 


 


 





 


Urine Bacteria


 


 None seen /hpf


(None Seen) 


 





 


Urine Glucose


 


 Normal mg/dL


(Normal) 


 





 


Urine Opiates Screen  Pos (NEGATIVE)   


 


Urine Fentanyl Screen  Neg (NEGATIVE)   


 


Urine Barbiturates Screen  Neg (NEGATIVE)   


 


Urine Phencyclidine Screen  Neg (NEGATIVE)   


 


Urine Amphetamines Screen  Neg (NEGATIVE)   


 


Urine Benzodiazepines Screen  Neg (NEGATIVE)   


 


Urine Cocaine Screen  Neg (NEGATIVE)   


 


Urine Cannabinoids Screen  Neg (NEGATIVE)   


 


Ammonia


 


 


 < 10 umol/L


(11-32) 





 


Magnesium Level


 


 


 


 1.8 mg/dL


(1.6-2.6)


 


Vitamin B12 Level


 


 


 


 334 pg/mL


(211-911)


 


Folic Acid


 


 


 


 2.37 ng/mL


(>5.38)


 


Thyroid Stimulating Hormone


(TSH) 


 


 


 1.20 uIU/mL


(0.55-4.78)


 


Plasma/Serum Blood Alcohol


 


 


 


 < 3.0 mg/dL


(<10)


 


Test


 11/29/24


17:23 


 


 





 


D-Dimer, Quantitative


 0.56 mg/L FEU


(0.0-0.49) 


 


 





 


Hemoglobin A1c


 5.0 % A1C


(<5.7) 


 


 





 


Troponin I High Sensitivity


 < 3 ng/L


(</=54) 


 


 








                             Other Laboratory Tests


11/30/24 04:23











Brief Hx & Hospital Course:


Patient was admitted from the emergency room because of worsening advanced 

Parkinson disease and altered mental status.  He was having decline in 

functions.  He became more slow.  Some difficulty swallowing.  Patient's family 

requesting comfort care.  Patient is being discharged on hospice.





Condition at Discharge:


Guarded





Final Diagnosis/Problems List





advance dementia





parkinson disease





BPH


Secondary Diagnosis:  


Failure to thrive





Discharge Disposition:


Hospice - Home





Discharge Instruct/Medications


Diet:  See Comment


Diet comment:  


per hospice, liquid, risk of aspiration


Activity:  Light activity


Follow Up/Referral:  


per hospice


Medications:  


per hospice


40


Discharge Statement:


"Patient was advised to return to the ER or call 911 if any headaches, 

dizziness, shortness of breath, chest pain, abdominal pain, bleeding, fevers, or

worsening of medical condition.





Patient was counseled about treatment plan, medications, possible side effects, 

patientverbalized understanding. All questions were answered to the best of my 

ability.





This discharge took greater then 30 minutes in planning, reviewing 

documentation, counseling the patient, and discussing with other team members."





ASSESSMENT


advance dementia


parkinson disease


BPH





Date of Service:  Dec 1, 2024


Billing Provider:  XUAN HOBSON MD


Common Visit Codes:  28579-RDG/OBS DISCH DAY >30min











XUAN HOBSON MD               Dec 1, 2024 17:10